# Patient Record
Sex: MALE | Race: WHITE | Employment: UNEMPLOYED | URBAN - METROPOLITAN AREA
[De-identification: names, ages, dates, MRNs, and addresses within clinical notes are randomized per-mention and may not be internally consistent; named-entity substitution may affect disease eponyms.]

---

## 2019-12-04 ENCOUNTER — HOSPITAL ENCOUNTER (EMERGENCY)
Facility: CLINIC | Age: 29
Discharge: HOME | End: 2019-12-04
Attending: EMERGENCY MEDICINE | Admitting: EMERGENCY MEDICINE
Payer: COMMERCIAL

## 2019-12-04 VITALS
HEART RATE: 73 BPM | RESPIRATION RATE: 16 BRPM | HEIGHT: 67 IN | TEMPERATURE: 98 F | SYSTOLIC BLOOD PRESSURE: 129 MMHG | WEIGHT: 205 LBS | DIASTOLIC BLOOD PRESSURE: 72 MMHG | OXYGEN SATURATION: 95 % | BODY MASS INDEX: 32.18 KG/M2

## 2019-12-04 DIAGNOSIS — F32.A DEPRESSION, UNSPECIFIED DEPRESSION TYPE: ICD-10-CM

## 2019-12-04 PROCEDURE — 99285 EMERGENCY DEPT VISIT HI MDM: CPT | Mod: 25

## 2019-12-04 PROCEDURE — 90791 PSYCH DIAGNOSTIC EVALUATION: CPT

## 2019-12-04 ASSESSMENT — ENCOUNTER SYMPTOMS
ABDOMINAL PAIN: 0
SORE THROAT: 0
HEADACHES: 1

## 2019-12-04 ASSESSMENT — MIFFLIN-ST. JEOR: SCORE: 1848.5

## 2019-12-04 NOTE — ED AVS SNAPSHOT
Emergency Department  [address] [place] [address]  Phone:  [phone]  Fax:  [phone]                                    Tolu Jackson   MRN: [id number]    Department:   Emergency Department   Date of Visit:  12/4/2019           After Visit Summary Signature Page    I have received my discharge instructions, and my questions have been answered. I have discussed any challenges I see with this plan with the nurse or doctor.    ..........................................................................................................................................  Patient/Patient Representative Signature      ..........................................................................................................................................  Patient Representative Print Name and Relationship to Patient    ..................................................               ................................................  Date                                   Time    ..........................................................................................................................................  Reviewed by Signature/Title    ...................................................              ..............................................  Date                                               Time          22EPIC Rev 08/18
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
## 2020-04-02 ENCOUNTER — VIRTUAL VISIT (OUTPATIENT)
Dept: FAMILY MEDICINE | Facility: CLINIC | Age: 30
End: 2020-04-02
Payer: COMMERCIAL

## 2020-04-02 VITALS — WEIGHT: 210 LBS | BODY MASS INDEX: 32.89 KG/M2

## 2020-04-02 DIAGNOSIS — L23.7 CONTACT DERMATITIS DUE TO POISON IVY: Primary | ICD-10-CM

## 2020-04-02 PROCEDURE — 99441 ZZC PHYSICIAN TELEPHONE EVALUATION 5-10 MIN: CPT | Performed by: OBSTETRICS & GYNECOLOGY

## 2020-04-02 RX ORDER — MOMETASONE FUROATE 1 MG/G
CREAM TOPICAL 2 TIMES DAILY PRN
Qty: 45 G | Refills: 0 | Status: SHIPPED | OUTPATIENT
Start: 2020-04-02

## 2020-04-02 RX ORDER — PREDNISONE 20 MG/1
60 TABLET ORAL DAILY
Qty: 21 TABLET | Refills: 0 | Status: SHIPPED | OUTPATIENT
Start: 2020-04-02 | End: 2020-04-12

## 2020-04-02 NOTE — PROGRESS NOTES
"Subjective     Tolu Jackson is a 29 year old male who is being evaluated via a billable telephone visit.      The patient has been notified of following:     \"This telephone visit will be conducted via a call between you and your physician/provider. We have found that certain health care needs can be provided without the need for a physical exam.  This service lets us provide the care you need with a short phone conversation.  If a prescription is necessary we can send it directly to your pharmacy.  If lab work is needed we can place an order for that and you can then stop by our lab to have the test done at a later time.    If during the course of the call the physician/provider feels a telephone visit is not appropriate, you will not be charged for this service.\"     Patient has given verbal consent for Telephone visit?  Yes    Tolu Jackson complains of   Chief Complaint   Patient presents with     Derm Problem     He thinks he has poison ivy.  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  He has had poison ivy before.  He has used a steroid pill as well as cream and it has helped in the past.  He has noticed some slight welts and the rash is diffuse and patchy from his elbows down to his hands on both arms.  Very itchy.  Similar to previous episodes of poison ivy.  ALLERGIES  Pertussis vaccine        Objective   Reported vitals:  Wt 95.3 kg (210 lb)   BMI 32.89 kg/m         Assessment/Plan:    Poison ivy-See rx for prednisone pills and Elocon cream to help treat the poison ivy. I explained that using steroids does come with some risks, most of them with long term use, such as immune suppression, gastric irritation, weight gain, but with a short term course of use the risks should be minimal and in this case are justified.  If his symptoms do not improve he will let me know within 1 week.  Also I encouraged him to wash his hands thoroughly all the way from the elbows down " after he has been out in the field.    Follow up sooner if worse    Phone call duration:  5 minutes    RATNA Haywood MD

## 2020-04-12 ENCOUNTER — HOSPITAL ENCOUNTER (EMERGENCY)
Facility: CLINIC | Age: 30
Discharge: HOME | End: 2020-04-12
Attending: EMERGENCY MEDICINE | Admitting: EMERGENCY MEDICINE
Payer: COMMERCIAL

## 2020-04-12 VITALS
OXYGEN SATURATION: 98 % | DIASTOLIC BLOOD PRESSURE: 81 MMHG | TEMPERATURE: 98.6 F | BODY MASS INDEX: 32.89 KG/M2 | SYSTOLIC BLOOD PRESSURE: 136 MMHG | RESPIRATION RATE: 18 BRPM | WEIGHT: 210 LBS | HEART RATE: 81 BPM

## 2020-04-12 DIAGNOSIS — L23.9 ALLERGIC CONTACT DERMATITIS, UNSPECIFIED TRIGGER: ICD-10-CM

## 2020-04-12 PROCEDURE — 99284 EMERGENCY DEPT VISIT MOD MDM: CPT | Mod: Z6 | Performed by: EMERGENCY MEDICINE

## 2020-04-12 PROCEDURE — 99282 EMERGENCY DEPT VISIT SF MDM: CPT | Performed by: EMERGENCY MEDICINE

## 2020-04-12 RX ORDER — DIPHENHYDRAMINE HCL 25 MG
50 CAPSULE ORAL EVERY 6 HOURS PRN
Qty: 20 CAPSULE | Refills: 0 | Status: SHIPPED | OUTPATIENT
Start: 2020-04-12

## 2020-04-12 RX ORDER — CEPHALEXIN 500 MG/1
500 CAPSULE ORAL 3 TIMES DAILY
Qty: 21 CAPSULE | Refills: 0 | Status: SHIPPED | OUTPATIENT
Start: 2020-04-12 | End: 2020-04-19

## 2020-04-12 RX ORDER — PREDNISONE 10 MG/1
TABLET ORAL
Qty: 32 TABLET | Refills: 0 | Status: SHIPPED | OUTPATIENT
Start: 2020-04-12 | End: 2020-04-22

## 2020-04-12 NOTE — ED PROVIDER NOTES
History     Chief Complaint   Patient presents with     Rash     HPI  Tolu Jackson is a 29 year old male who presents with some intense itching and rash.  Approximately 12 days ago he developed a rash on both his arms legs and a little on his face.  He did a virtual visit 2 days  after that in clinic and was placed on 7 days of prednisone.  He continues to itch incredibly.  He has had no fever but has scratched these to the point of breaking multiple areas open.  It is not improving.  He feels like it is spreading up his arms.  No fever.    Allergies:  Allergies   Allergen Reactions     Pertussis Vaccine Other (See Comments)     Seizures       Problem List:    There are no active problems to display for this patient.       Past Medical History:    Past Medical History:   Diagnosis Date     Anxiety      Depressive disorder        Past Surgical History:    Past Surgical History:   Procedure Laterality Date     ENT SURGERY      ear tubes       Family History:    No family history on file.    Social History:  Marital Status:  Single [1]  Social History     Tobacco Use     Smoking status: Current Every Day Smoker     Packs/day: 1.00     Smokeless tobacco: Former User   Substance Use Topics     Alcohol use: Yes     Comment: almost never     Drug use: Yes     Types: Methamphetamines, Marijuana     Comment: last used in August        Medications:    cephALEXin (KEFLEX) 500 MG capsule  diphenhydrAMINE (BENADRYL ALLERGY) 25 MG capsule  predniSONE (DELTASONE) 10 MG tablet  mometasone (ELOCON) 0.1 % external cream          Review of Systems  All other systems are reviewed and are negative    Physical Exam   BP: 136/81  Pulse: 81  Temp: 98.6  F (37  C)  Resp: 18  Weight: 95.3 kg (210 lb)  SpO2: 98 %      Physical Exam  Constitutional:       General: He is not in acute distress.     Appearance: He is not diaphoretic.   HENT:      Head: Atraumatic.   Eyes:      General: No scleral icterus.     Pupils: Pupils are equal,  round, and reactive to light.   Cardiovascular:      Heart sounds: Normal heart sounds.   Pulmonary:      Effort: No respiratory distress.      Breath sounds: Normal breath sounds.   Abdominal:      General: Bowel sounds are normal.      Palpations: Abdomen is soft.      Tenderness: There is no abdominal tenderness.   Musculoskeletal:         General: No tenderness.   Skin:     General: Skin is warm.      Findings: Rash present.      Comments: Forearms bilaterally reveals multiple areas of excoriations.   2 areas to the ventral left forearm reveals some erythema about them.  Other areas with swelling consistent with a contact dermatitis.  Legs with some less severe but similar reactions.         ED Course        Procedures               Critical Care time:  none               No results found for this or any previous visit (from the past 24 hour(s)).    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  Will place on a longer prednisone taper.  He has not been using any antihistamines and have recommended Benadryl for this.  Also may be starting to develop some secondary cellulitis and placed on Keflex.  Follow-up in clinic if not improving in 3 days.  Return anytime sooner the emergency department if condition worsens or other concern.     I have reviewed the nursing notes.    I have reviewed the findings, diagnosis, plan and need for follow up with the patient.       New Prescriptions    CEPHALEXIN (KEFLEX) 500 MG CAPSULE    Take 1 capsule (500 mg) by mouth 3 times daily for 7 days    DIPHENHYDRAMINE (BENADRYL ALLERGY) 25 MG CAPSULE    Take 2 capsules (50 mg) by mouth every 6 hours as needed for itching    PREDNISONE (DELTASONE) 10 MG TABLET    Take 4 tablets daily for 5 days,  take 2 tablets daily for 3 days, take 1 tablet daily for 3 days, take half a tablet for 3 days.       Final  diagnoses:   Allergic contact dermatitis, unspecified trigger       4/12/2020   [facility] EMERGENCY DEPARTMENT     Govind Medel MD  04/12/20 3567

## 2020-04-12 NOTE — DISCHARGE INSTRUCTIONS
You may  additional Benadryl over-the-counter 25 mg, 1 to 2 tablets every 4-6 hours as needed for itching.

## 2020-04-12 NOTE — ED TRIAGE NOTES
Rash x 10 days. Had on call visit 10 days ago and was prescribed steroid and cream and it seemed like it was getting better but woke up today significantly worse. No new exposures.  Thought it was maybe poison ivy. Rash to hands, arms, groin, face, and trunk.

## 2020-04-12 NOTE — ED AVS SNAPSHOT
[facility] Emergency Department  [address] [place] [address]  Phone:  [phone]  Fax:  [phone]                                    Tolu Jackson   MRN: [id number]    Department:  [facility] Emergency Department   Date of Visit:  4/12/2020           After Visit Summary Signature Page    I have received my discharge instructions, and my questions have been answered. I have discussed any challenges I see with this plan with the nurse or doctor.    ..........................................................................................................................................  Patient/Patient Representative Signature      ..........................................................................................................................................  Patient Representative Print Name and Relationship to Patient    ..................................................               ................................................  Date                                   Time    ..........................................................................................................................................  Reviewed by Signature/Title    ...................................................              ..............................................  Date                                               Time          22EPIC Rev 08/18

## 2021-01-15 ENCOUNTER — HEALTH MAINTENANCE LETTER (OUTPATIENT)
Age: 31
End: 2021-01-15

## 2021-09-04 ENCOUNTER — HEALTH MAINTENANCE LETTER (OUTPATIENT)
Age: 31
End: 2021-09-04

## 2022-02-19 ENCOUNTER — HEALTH MAINTENANCE LETTER (OUTPATIENT)
Age: 32
End: 2022-02-19

## 2022-10-16 ENCOUNTER — HEALTH MAINTENANCE LETTER (OUTPATIENT)
Age: 32
End: 2022-10-16

## 2023-04-01 ENCOUNTER — HEALTH MAINTENANCE LETTER (OUTPATIENT)
Age: 33
End: 2023-04-01